# Patient Record
Sex: FEMALE
[De-identification: names, ages, dates, MRNs, and addresses within clinical notes are randomized per-mention and may not be internally consistent; named-entity substitution may affect disease eponyms.]

---

## 2018-06-28 ENCOUNTER — HOSPITAL ENCOUNTER (EMERGENCY)
Dept: HOSPITAL 65 - ER | Age: 18
Discharge: HOME | End: 2018-06-28
Payer: COMMERCIAL

## 2018-06-28 VITALS — HEIGHT: 61 IN | WEIGHT: 125 LBS | BODY MASS INDEX: 23.6 KG/M2

## 2018-06-28 VITALS — SYSTOLIC BLOOD PRESSURE: 114 MMHG | DIASTOLIC BLOOD PRESSURE: 66 MMHG

## 2018-06-28 VITALS — DIASTOLIC BLOOD PRESSURE: 68 MMHG | SYSTOLIC BLOOD PRESSURE: 116 MMHG

## 2018-06-28 DIAGNOSIS — R11.0: ICD-10-CM

## 2018-06-28 DIAGNOSIS — Z79.899: ICD-10-CM

## 2018-06-28 DIAGNOSIS — M54.5: Primary | ICD-10-CM

## 2018-06-28 DIAGNOSIS — Z90.49: ICD-10-CM

## 2018-06-28 LAB
ALP INTEST CFR SERPL: 82 U/L (ref 50–136)
ALT SERPL-CCNC: 22 U/L (ref 12–78)
APPEARANCE UR: CLEAR
AST SERPL-CCNC: 14 U/L (ref 0–35)
BASOPHILS # BLD AUTO: 0 10^3/UL (ref 0–0.1)
BASOPHILS NFR BLD AUTO: 0.6 % (ref 0–0.2)
BILIRUB UR STRIP.AUTO-MCNC: NEGATIVE MG/DL
CALCIUM SERPL-MCNC: 8.8 MG/DL (ref 8.4–10.5)
CO2 BLDA-SCNC: 24 MMOL/L (ref 20–32)
COLOR UR: YELLOW
EOSINOPHIL # BLD AUTO: 0.2 10^3/UL (ref 0–0.2)
EOSINOPHIL NFR BLD AUTO: 2.1 % (ref 0–5)
ERYTHROCYTE [DISTWIDTH] IN BLOOD BY AUTOMATED COUNT: 12.9 % (ref 11.5–14.5)
GLUCOSE PRE 100 G GLC PO SERPL-MCNC: 98 MG/DL (ref 70–110)
HGB BLD-MCNC: 15.2 G/DL (ref 12.4–14.8)
LYMPHOCYTES # BLD AUTO: 1.5 10^3/UL (ref 1.2–5.2)
LYMPHOCYTES NFR BLD AUTO: 20.2 % (ref 24–44)
MCH RBC QN AUTO: 31.5 PG (ref 26–34)
MCHC RBC AUTO-ENTMCNC: 35.6 G/DL (ref 33–37)
MCV RBC AUTO: 88.6 FL (ref 78–100)
MONOCYTES # BLD AUTO: 0.7 10^3/UL (ref 0–0.4)
MONOCYTES NFR BLD AUTO: 10.1 % (ref 5–12)
NEUTROPHILS # BLD AUTO: 4.9 10^3/UL (ref 1.8–8)
NEUTROPHILS NFR BLD AUTO: 66.9 % (ref 41–85)
PLATELET # BLD AUTO: 218 10^3/UL (ref 150–400)
PMV BLD AUTO: 9.8 FL (ref 7.8–11)
UROBILINOGEN UR QL STRIP.AUTO: NORMAL
WBC # BLD AUTO: 7.2 10^3/UL (ref 4.5–12.5)

## 2018-06-28 PROCEDURE — 81025 URINE PREGNANCY TEST: CPT

## 2018-06-28 PROCEDURE — 85025 COMPLETE CBC W/AUTO DIFF WBC: CPT

## 2018-06-28 PROCEDURE — 85610 PROTHROMBIN TIME: CPT

## 2018-06-28 PROCEDURE — 99285 EMERGENCY DEPT VISIT HI MDM: CPT

## 2018-06-28 PROCEDURE — 96372 THER/PROPH/DIAG INJ SC/IM: CPT

## 2018-06-28 PROCEDURE — 85730 THROMBOPLASTIN TIME PARTIAL: CPT

## 2018-06-28 PROCEDURE — 80053 COMPREHEN METABOLIC PANEL: CPT

## 2018-06-28 PROCEDURE — 36415 COLL VENOUS BLD VENIPUNCTURE: CPT

## 2018-06-28 PROCEDURE — 81000 URINALYSIS NONAUTO W/SCOPE: CPT

## 2018-06-28 PROCEDURE — 74176 CT ABD & PELVIS W/O CONTRAST: CPT

## 2018-06-28 NOTE — DIREP
PROCEDURE:CT ABD/PELVIS WITHOUT CONTRAST

 

TECHNIQUE:Axial cuts were obtained from the dome of the diaphragm to the 

ischial tuberosities.  No intravenous contrast was given.  The images were 

viewed at lung and soft tissue settings.  Sagittal and coronal reconstructions 

are provided.   

 

COMPARISON:[facility], CT, CT ABD/PELVIS W/O, 04/21/2018, 08:26 

AM.

 

INDICATIONS:Right flank pain

 

FINDINGS:

LOWER CHEST:The lung bases are clear.

LIVER:Normal.

BILIARY:Normal.

PANCREAS:Normal.

SPLEEN:Normal.

URINARY TRACT:Normal.

ADRENALS:Normal.

AORTA/VASCULAR:Normal.

RETROPERITONEUM:Normal.

BOWEL/MESENTERY:Normal.  The appendix is not clearly identified but there are 

no inflammatory changes in the right lower quadrant.

ABDOMINAL WALL:Normal.

PELVIS:Normal.

BONES:Normal.

OTHER:Normal.

 

CONCLUSION:No abnormalities to explain the patient's symptoms.  No change from 

prior study.

 

 

 

Dictated by: Liu Barros M.D. on 06/28/2018 at 10:23 PM     

Electronically Signed By: Liu Barros M.D. on 06/28/2018 at 10:27 PM

## 2018-06-28 NOTE — ER.PDOC
General


Chief Complaint:  Flank Pain


Stated Complaint:  R SIDE & BACK PAIN


Time seen by MD:  21:48


Source:  patient


Exam Limitations:  no limitations





History of Present Illness


Initial Comments


Right flank pain for 2 days


Severity/Quality:  moderate


Radiation:  RLQ


Associated Symptoms:  other (nausea)


Exacerbated by:  nothing


Relieved By:  nothing


Allergies:  


Coded Allergies:  


     No Known Allergies (Unverified , 5/28/17)


Home Meds


Reported Medications


Hydrocodone Bit/Acetaminophen (NORCO 5-325) 1 Each Tablet, 1 TAB PO Q8HR PRN 

for PAIN


   12/22/16


Vital Signs





First Vital Signs








  Date Time  Temp Pulse Resp B/P (MAP) Pulse Ox O2 Delivery O2 Flow Rate FiO2


 


5/11/18 15:47  98      


 


6/28/18 21:39 99.0  16  98 Room Air  


 


6/28/18 21:39    114/66 (82)    








Last Vital Signs








  Date Time  Temp Pulse Resp B/P (MAP) Pulse Ox O2 Delivery O2 Flow Rate FiO2


 


6/28/18 21:39 99.0 82 16     


 


6/28/18 21:39    114/66 (82) 98 Room Air  











Past Medical History


Medical History:  no pertinent history


Surgical History:  appendectomy


LMP (females 10-50):  3 weeks





Social History


Smoking:  non-smoker


Alcohol Use:  none


Drug Use:  none





Constitutional:  no symptoms reported


EENTM:  no symptoms reported


Respiratory:  no symptoms reported


Cardiovascular:  no symptoms reported


Gastrointestinal:  see HPI


Genitourinary:  see HPI


All Other Systems:  Reviewed and Negative





Physical Exam


General Appearance:  No Apparent Distress, WD/WN


HEENT:  PERRL/EOMI, Normal ENT Inspection, TMs Normal, Pharynx Normal


Neck:  Non-Tender, Full Range of Motion, Supple, Normal Inspection


Respiratory:  chest non-tender, lungs clear, normal breath sounds, no 

respiratory distress, no accessory muscle use


Cardiovascular:  Normal Peripheral Pulses, Regular Rate, Rhythm, No Edema, No 

Gallop, No JVD, No Murmur


Gastrointestinal:  Normal Bowel Sounds, Non Tender, Soft


Back:  CVA Tenderness (R)


Extremities:  Normal Range of Motion, Non-Tender, Normal Inspection, No Pedal 

Edema, No Calf Tenderness, Normal Capillary Refill, Pelvis Stable


Neurologic/Psychiatric:  CNs II-XII NML as Tested, No Motor/Sensory Deficits, 

Alert, Normal Mood/Affect, Oriented x 3


Skin:  Normal Color, Warm/Dry


Lymphatic:  No Adenopathy





EKG/XRAY/CT/US


CT Comments:  Nothing acute on CT abdomen/pelvis





Course


Vitals & review Data





Vital Sign - Last 24 Hours








 6/28/18 6/28/18 6/28/18





 21:39 21:39 21:39


 


Temp 99.0 99.0 99.0


 


Pulse 82 82 82


 


Resp 16 16 16


 


B/P (MAP)  114/66 (82) 


 


Pulse Ox 98 98 


 


O2 Delivery Room Air Room Air 











Departure


Time of Disposition:  22:36


Disposition:  01 HOME, SELF-CARE


Impression:  


 Primary Impression:  


 Low back pain


Condition:  Stable


Referrals:  


FRIDA LEÓN MD (PCP)


PRIMARY CARE PROVIDER





Additional Instructions:  


Ibuprofen


F/U with your PCP tomorrow.


Duration or Time Spent with Pa:  90 mins





Problem Qualifiers








 Primary Impression:  


 Low back pain


 Chronicity:  unspecified  Back pain laterality:  right  Sciatica presence:  

without sciatica  Qualified Codes:  M54.5 - Low back pain








KEYONNA TORRES MD Jun 28, 2018 21:50

## 2018-09-21 ENCOUNTER — HOSPITAL ENCOUNTER (EMERGENCY)
Dept: HOSPITAL 65 - ER | Age: 18
Discharge: HOME | End: 2018-09-21
Payer: COMMERCIAL

## 2018-09-21 VITALS — HEIGHT: 61 IN | WEIGHT: 127 LBS | BODY MASS INDEX: 23.98 KG/M2

## 2018-09-21 VITALS — DIASTOLIC BLOOD PRESSURE: 61 MMHG | SYSTOLIC BLOOD PRESSURE: 111 MMHG

## 2018-09-21 VITALS — SYSTOLIC BLOOD PRESSURE: 111 MMHG | DIASTOLIC BLOOD PRESSURE: 61 MMHG

## 2018-09-21 DIAGNOSIS — B96.89: ICD-10-CM

## 2018-09-21 DIAGNOSIS — N76.0: Primary | ICD-10-CM

## 2018-09-21 LAB
APPEARANCE UR: (no result)
BILIRUB UR STRIP.AUTO-MCNC: NEGATIVE MG/DL
COLOR UR: (no result)
UROBILINOGEN UR QL STRIP.AUTO: 1

## 2018-09-21 PROCEDURE — 87591 N.GONORRHOEAE DNA AMP PROB: CPT

## 2018-09-21 PROCEDURE — 81000 URINALYSIS NONAUTO W/SCOPE: CPT

## 2018-09-21 PROCEDURE — 99284 EMERGENCY DEPT VISIT MOD MDM: CPT

## 2018-09-21 PROCEDURE — 87086 URINE CULTURE/COLONY COUNT: CPT

## 2018-09-21 PROCEDURE — 87210 SMEAR WET MOUNT SALINE/INK: CPT

## 2018-09-21 PROCEDURE — 96372 THER/PROPH/DIAG INJ SC/IM: CPT

## 2018-09-21 PROCEDURE — 87077 CULTURE AEROBIC IDENTIFY: CPT

## 2018-09-21 PROCEDURE — 87186 SC STD MICRODIL/AGAR DIL: CPT

## 2018-09-21 PROCEDURE — 36415 COLL VENOUS BLD VENIPUNCTURE: CPT

## 2018-09-21 PROCEDURE — 81025 URINE PREGNANCY TEST: CPT

## 2018-09-21 NOTE — ER.PDOC
General


Chief Complaint:  Abdomen Pain


Stated Complaint:  ABD PAIN


Time seen by MD:  17:50


Source:  patient


Exam Limitations:  no limitations





History of Present Illness


Initial Comments


Thick foul vag disch for 1 wk.  Had unprotected sex 2 wks ago.  No sores.  

Pelvic pain.  No urine or bowel c/o.


Timing/Duration:  other (above)


Severity/Quality:  mild, moderate


Radiation:  no radiation


Associated Symptoms:  denies symptoms, other (LMP entire last month.  Unusual 

for her, but just came off contraceptive hormone Tx.)


Exacerbated by:  nothing


Relieved By:  nothing


Allergies:  


Coded Allergies:  


     No Known Allergies (Unverified , 5/28/17)


Home Meds


Reported Medications


Hydrocodone Bit/Acetaminophen (NORCO 5-325) 1 Each Tablet, 1 TAB PO Q8HR PRN 

for PAIN


   12/22/16


Vital Signs





First Vital Signs








  Date Time  Temp Pulse Resp B/P (MAP) Pulse Ox O2 Delivery O2 Flow Rate FiO2


 


9/21/18 17:05 98.2 89 16     





 98.2       


 


9/21/18 17:05     99 Room Air  


 


9/21/18 17:08    111/61 (78)    








Last Vital Signs








  Date Time  Temp Pulse Resp B/P (MAP) Pulse Ox O2 Delivery O2 Flow Rate FiO2


 


9/21/18 17:08 98.2 89 16 111/61 (78) 99 Room Air  





 98.2       











Past Medical History


Medical History:  no pertinent history


Surgical History:  no surgical history


LMP (females 10-50):  IRREGULAR MENS.





Social History


Smoking:  non-smoker


Alcohol Use:  none


Drug Use:  none





Constitutional:  no symptoms reported


EENTM:  no symptoms reported


Respiratory:  no symptoms reported


Cardiovascular:  no symptoms reported


Gastrointestinal:  no symptoms reported


Genitourinary:  see HPI


Musculoskeletal:  no symptoms reported


Skin:  no symptoms reported


All Other Systems:  Reviewed and Negative





Physical Exam


General Appearance:  No Apparent Distress, WD/WN, Anxious


HEENT:  PERRL/EOMI, Normal ENT Inspection


Neck:  Non-Tender, Full Range of Motion, Supple


Respiratory:  chest non-tender, lungs clear, normal breath sounds


Cardiovascular:  Normal Peripheral Pulses, Regular Rate, Rhythm, No Edema, No 

Gallop


Gastrointestinal:  Normal Bowel Sounds, No Pulsatile Mass, Tenderness (w/o 

guarding.  )


Pelvic:  Normal External Exam, Discharge (thick white. ), Tender w/ Cervical 

Motion, Tender Adnexa (R>L)


Back:  Normal Inspection, No CVA Tenderness


Extremities:  Normal Range of Motion, Non-Tender, Normal Inspection


Neurologic/Psychiatric:  CNs II-XII NML as Tested, No Motor/Sensory Deficits, 

Alert, Normal Mood/Affect, Oriented x 3


Skin:  Normal Color, Warm/Dry


Lymphatic:  No Adenopathy





Results/Orders


Results/Orders





Laboratory Tests








Test


 9/21/18


17:32


 


Urine Collection Type CCMS 


 


Urine Color YINA (YELLOW) 


 


Urine Appearance CLOUDY (CLEAR) 


 


Urine Bilirubin


 NEGATIVE MG/DL


(NEGATIVE)


 


Urine Ketones


 NEGATIVE


(NEGATIVE)


 


Urine Specific Gravity


 1.020


(1.005-1.035)


 


Urine pH 6.5 (5.0-6.0) 


 


Urine Protein


 NEGATIVE


(NEGATIVE)


 


Urine Urobilinogen 1.0 (NEGATIVE) 


 


Urine Nitrate


 NEGATIVE


(NEGATIVE)


 


Urine Leukocyte Esterase


 100/ul  1+


(NEGATIVE)


 


Urine Blood


 NEGATIVE


(NEGATIVE)


 


Urine RBC


 NONE SEEN


RBC/HPF (NONE


 


Urine WBC


 5-10 WBC/HPF


(0-2)


 


Urine Squamous Epithelial


Cells MODERATE #/HPF


(FEW)


 


Urine Bacteria


 MODERATE (NONE


SEEN)


 


Urine Other


 MODERATE


MUCOUS #/HPF


 


Urine Glucose


 NORMAL


(NEGATIVE)


 


Urine HCG, Qualitative


 NEGATIVE


(NEGATIVE)











Progress


Progress


Preg test neg.  Given STD meds.  wet prep pos for clue cells.





Course


Vitals & review Data





Vital Sign - Last 24 Hours








 9/21/18 9/21/18 9/21/18





 17:05 17:05 17:08


 


Temp 98.2 98.2 98.2





 98.2 98.2 98.2


 


Pulse 89 89 89


 


Resp 16 16 16


 


B/P (MAP)   111/61 (78)


 


Pulse Ox  99 99


 


O2 Delivery  Room Air Room Air








Laboratory Tests








Test


 9/21/18


17:32


 


Urine Collection Type CCMS 


 


Urine Color YINA 


 


Urine Appearance CLOUDY 


 


Urine Bilirubin NEGATIVE MG/DL 


 


Urine Ketones NEGATIVE 


 


Urine Specific Gravity 1.020 


 


Urine pH 6.5 


 


Urine Protein NEGATIVE 


 


Urine Urobilinogen 1.0 


 


Urine Nitrate NEGATIVE 


 


Urine Leukocyte Esterase 100/ul  1+ 


 


Urine Blood NEGATIVE 


 


Urine RBC


 NONE SEEN


RBC/HPF


 


Urine WBC 5-10 WBC/HPF 


 


Urine Squamous Epithelial


Cells MODERATE #/HPF 





 


Urine Bacteria MODERATE 


 


Urine Other


 MODERATE


MUCOUS #/HPF


 


Urine Glucose NORMAL 


 


Urine HCG, Qualitative NEGATIVE 











Departure


Time of Disposition:  20:38


Disposition:  01 HOME, SELF-CARE


Impression:  


 Primary Impression:  


 Bacterial vaginosis


Condition:  Stable


Patient Instructions:  Bacterial Vaginosis, Sexually Transmitted Diseases (STD) 

In Pregnancy


Referrals:  


FRIDA LEÓN MD (PCP)


PRIMARY CARE PROVIDER





Additional Instructions:  


No douching.  See your doctor next week for follow up.  Return if worse.


No intercourse until 1 week after treatment complete.


Duration or Time Spent with Pa:  45











JULIETA POZO DO Sep 21, 2018 19:39

## 2018-09-21 NOTE — NUR
Arrival



Pt in ED with the complaint of lower abdominal pain. States that it feels like 
when she had a bad UTI once, but she is worried that she may have got an STD 
from a boy that she slept with 3 weeks ago. Stated that her friend told her the 
boy was tested and had chlamydia. Pt. stated that she was on her period for 
almost the whole month of August and after that has discharge that what clear 
and white in color. Pt. states that the discharge has a foul odor. Pt is laying 
on gurny visiting with friend, pt is in stable condition at this time

## 2018-10-17 ENCOUNTER — HOSPITAL ENCOUNTER (EMERGENCY)
Dept: HOSPITAL 65 - ER | Age: 18
Discharge: HOME | End: 2018-10-17
Payer: COMMERCIAL

## 2018-10-17 VITALS — SYSTOLIC BLOOD PRESSURE: 117 MMHG | DIASTOLIC BLOOD PRESSURE: 63 MMHG

## 2018-10-17 VITALS — SYSTOLIC BLOOD PRESSURE: 102 MMHG | DIASTOLIC BLOOD PRESSURE: 59 MMHG

## 2018-10-17 VITALS — HEIGHT: 61 IN | WEIGHT: 126 LBS | BODY MASS INDEX: 23.79 KG/M2

## 2018-10-17 VITALS — DIASTOLIC BLOOD PRESSURE: 59 MMHG | SYSTOLIC BLOOD PRESSURE: 102 MMHG

## 2018-10-17 DIAGNOSIS — Z79.899: ICD-10-CM

## 2018-10-17 DIAGNOSIS — R10.12: ICD-10-CM

## 2018-10-17 DIAGNOSIS — R10.11: Primary | ICD-10-CM

## 2018-10-17 DIAGNOSIS — R11.10: ICD-10-CM

## 2018-10-17 DIAGNOSIS — R19.7: ICD-10-CM

## 2018-10-17 LAB
ALP INTEST CFR SERPL: 83 U/L (ref 50–136)
ALT SERPL-CCNC: 19 U/L (ref 12–78)
APPEARANCE UR: (no result)
AST SERPL-CCNC: 11 U/L (ref 0–35)
BASOPHILS # BLD AUTO: 0 10^3/UL (ref 0–0.1)
BASOPHILS NFR BLD AUTO: 0.6 % (ref 0–0.2)
BILIRUB UR STRIP.AUTO-MCNC: NEGATIVE MG/DL
CALCIUM SERPL-MCNC: 9.2 MG/DL (ref 8.4–10.5)
CO2 BLDA-SCNC: 25.3 MMOL/L (ref 20–32)
COLOR UR: YELLOW
EOSINOPHIL # BLD AUTO: 0.2 10^3/UL (ref 0–0.2)
EOSINOPHIL NFR BLD AUTO: 3.7 % (ref 0–5)
ERYTHROCYTE [DISTWIDTH] IN BLOOD BY AUTOMATED COUNT: 12.2 % (ref 11.5–14.5)
GLUCOSE PRE 100 G GLC PO SERPL-MCNC: 104 MG/DL (ref 70–110)
HGB BLD-MCNC: 16.3 G/DL (ref 12.4–14.8)
LYMPHOCYTES # BLD AUTO: 1.9 10^3/UL (ref 1.2–5.2)
LYMPHOCYTES NFR BLD AUTO: 36.4 % (ref 24–44)
Lab: NEGATIVE
MCH RBC QN AUTO: 31.2 PG (ref 26–34)
MCHC RBC AUTO-ENTMCNC: 35.4 G/DL (ref 33–37)
MCV RBC AUTO: 88.1 FL (ref 78–100)
MONOCYTES # BLD AUTO: 0.6 10^3/UL (ref 0–0.4)
MONOCYTES NFR BLD AUTO: 11.2 % (ref 5–12)
NEUTROPHILS # BLD AUTO: 2.4 10^3/UL (ref 1.8–8)
NEUTROPHILS NFR BLD AUTO: 48.1 % (ref 41–85)
PLATELET # BLD AUTO: 214 10^3/UL (ref 150–400)
PMV BLD AUTO: 9.8 FL (ref 7.8–11)
UROBILINOGEN UR QL STRIP.AUTO: NORMAL
WBC # BLD AUTO: 5.1 10^3/UL (ref 4.5–12.5)

## 2018-10-17 PROCEDURE — 85025 COMPLETE CBC W/AUTO DIFF WBC: CPT

## 2018-10-17 PROCEDURE — 82150 ASSAY OF AMYLASE: CPT

## 2018-10-17 PROCEDURE — 87086 URINE CULTURE/COLONY COUNT: CPT

## 2018-10-17 PROCEDURE — 76705 ECHO EXAM OF ABDOMEN: CPT

## 2018-10-17 PROCEDURE — 80307 DRUG TEST PRSMV CHEM ANLYZR: CPT

## 2018-10-17 PROCEDURE — 84703 CHORIONIC GONADOTROPIN ASSAY: CPT

## 2018-10-17 PROCEDURE — 96372 THER/PROPH/DIAG INJ SC/IM: CPT

## 2018-10-17 PROCEDURE — 85610 PROTHROMBIN TIME: CPT

## 2018-10-17 PROCEDURE — 80053 COMPREHEN METABOLIC PANEL: CPT

## 2018-10-17 PROCEDURE — 86677 HELICOBACTER PYLORI ANTIBODY: CPT

## 2018-10-17 PROCEDURE — 99285 EMERGENCY DEPT VISIT HI MDM: CPT

## 2018-10-17 PROCEDURE — 36415 COLL VENOUS BLD VENIPUNCTURE: CPT

## 2018-10-17 PROCEDURE — 83690 ASSAY OF LIPASE: CPT

## 2018-10-17 PROCEDURE — 81000 URINALYSIS NONAUTO W/SCOPE: CPT

## 2018-10-17 NOTE — ER.PDOC
General


Chief Complaint:  Abdomen Pain


Stated Complaint:  ABD PAINS, D


Time seen by MD:  09:00


Source:  patient


Exam Limitations:  no limitations





History of Present Illness


Severity/Quality:  mild


Abdominal Pain Onset Location:  RUQ, LUQ


Associated Symptoms (vomiting):  mild vomiting


Associated Symptoms (diarrhea):  mild, blood-streaked


Allergies:  


Coded Allergies:  


     No Known Allergies (Unverified , 5/28/17)


Home Meds


Reported Medications


Hydrocodone Bit/Acetaminophen (NORCO 5-325) 1 Each Tablet, 1 TAB PO Q8HR PRN 

for PAIN


   12/22/16


Vital Signs





First Vital Signs








  Date Time  Temp Pulse Resp B/P (MAP) Pulse Ox O2 Delivery O2 Flow Rate FiO2


 


9/21/18 20:52  89      


 


10/17/18 09:07 97.8  18  98 Room Air  





 97.8       


 


10/17/18 09:12    117/63 (81)    








Last Vital Signs








  Date Time  Temp Pulse Resp B/P (MAP) Pulse Ox O2 Delivery O2 Flow Rate FiO2


 


10/17/18 09:12 97.3 76 18 117/63 (81) 98 Room Air  





 97.3       











Past Medical History


Medical History:  no pertinent history


Surgical History:  no surgical history


LMP (females 10-50):  1 month





Family History


Significant Family History:  no pertinent family hx





Social History


Smoking:  non-smoker


Alcohol Use:  none


Drug Use:  none





Reviewed


Nursing Reviewed:  Vital Signs, Abn. Noted





All Other Systems:  Reviewed and Negative





Physical Exam


General Appearance:  No Apparent Distress, WD/WN


HEENT:  PERRL/EOMI, Normal ENT Inspection, TMs Normal, Pharynx Normal


Neck:  Non-Tender, Full Range of Motion, Supple, Normal Inspection


Respiratory:  chest non-tender, lungs clear, normal breath sounds, no 

respiratory distress, no accessory muscle use


Cardiovascular:  Normal Peripheral Pulses, Regular Rate, Rhythm, No Edema, No 

Gallop, No JVD, No Murmur


Gastrointestinal:  Tenderness (RUQ)


Back:  Normal Inspection, No CVA Tenderness, No Vertebral Tenderness


Extremities:  Normal Range of Motion, Non-Tender, Normal Inspection, No Pedal 

Edema, No Calf Tenderness, Normal Capillary Refill, Pelvis Stable


Neurologic/Psychiatric:  CNs II-XII NML as Tested, No Motor/Sensory Deficits, 

Alert, Normal Mood/Affect, Oriented x 3


Skin:  Normal Color, Warm/Dry


Lymphatic:  No Adenopathy





Results/Orders


Results/Orders





Laboratory Tests








Test


 10/17/18


09:25


 


White Blood Count


 5.1 10^3/uL


(4.5-12.5)


 


Red Blood Count


 5.22 10^6/uL


(4.00-5.20)


 


Hemoglobin


 16.3 g/dL


(12.4-14.8)


 


Hematocrit


 46.0 %


(36.0-46.0)


 


Mean Corpuscular Volume


 88.1 fL


()


 


Mean Corpuscular Hemoglobin


 31.2 pg


(26-34)


 


Mean Corpuscular Hemoglobin


Concent 35.4 g/dL


(33-37)


 


Red Cell Distribution Width


 12.2 %


(11.5-14.5)


 


Platelet Count


 214 10^3/uL


(150-400)


 


Mean Platelet Volume


 9.8 fL


(7.8-11.0)


 


Neutrophils (%) (Auto)


 48.1 %


(41.0-85.0)


 


Lymphocytes (%) (Auto)


 36.4 %


(24.0-44.0)


 


Monocytes (%) (Auto)


 11.2 %


(5.0-12.0)


 


Neutrophils # (Auto)


 2.4 10^3/uL


(1.8-8.0)


 


Lymphocytes # (Auto)


 1.9 10^3/uL


(1.2-5.2)


 


Monocytes # (Auto)


 0.6 10^3/uL


(0.0-0.4)


 


Absolute Immature Granulocyte


(auto 0 10^3 u/L


(0-2)


 


Eosinophils %


 3.7 %


(0.0-5.0)


 


Basophils %


 0.6 %


(0.0-0.2)


 


Basophils #


 0.0 10^3/uL


(0.0-0.1)


 


Eosinophil Count


 0.2 10^3/uL


(0.0-0.2)


 


Prothrombin Time


 9.9 SEC


(9.8-11.9)


 


Prothrombin Time INR


(Non-Therap) 1.0 





 


Sodium Level


 141 mmol/L


(132-145)


 


Potassium Level


 3.6 mmol/L


(3.6-5.2)


 


Chloride Level


 105.0 mmol/L


()


 


Carbon Dioxide Level


 25.3 mmol/L


(20.0-32)


 


Anion Gap 14.3 


 


Blood Urea Nitrogen


 14 mg/dL


(7-18)


 


Creatinine


 0.82 mg/dL


(0.59-1.40)


 


Estimated GFR (


American) 108.7 (>/=60) 





 


BUN/Creatinine Ratio 17.0 


 


Glucose Level


 104 mg/dL


()


 


Calcium Level


 9.2 mg/dL


(8.4-10.5)


 


Total Bilirubin


 0.6 mg/dL


(0.2-1.0)


 


Aspartate Amino Transf


(AST/SGOT) 11 U/L (0-35) 





 


Alanine Aminotransferase


(ALT/SGPT) 19 U/L (12-78) 





 


Alkaline Phosphatase


 83 U/L


()


 


Total Protein


 7.6 g/dL


(6.4-8.2)


 


Albumin


 4.0 g/dL


(3.4-5.0)


 


Globulin 3.6 


 


Amylase Level


 41 U/L


()


 


Lipase


 167 U/L


(114-286)


 


Serum HCG, Qualitative


 NEGATIVE


(NEGATIVE)


 


Percent Immature Gran (Cell


Imm) 0.00 %


(0.00-0.50)


 


Helicobacter pylori Screen


 NEGATIVE


(NEGATIVE)








Administered Medications








 Medications


  (Trade)  Dose


 Ordered  Sig/Swapnil


 Route


 PRN Reason  Start Time


 Stop Time Status Last Admin


Dose Admin


 


 Diphenoxylate HCl/


 Atropine


  (Lomotil)  1 each  STAT  STAT


 PO


   10/17/18 09:16


 10/17/18 09:21 DC 10/17/18 09:54





 


 Ondansetron HCl


  (Zofran Odt)  4 mg  STAT  STAT


 SL


   10/17/18 09:16


 10/17/18 09:21 DC 10/17/18 09:54





 


 Ketorolac


 Tromethamine


  (Toradol)  60 mg  OT  ONCE


 IM


   10/17/18 09:30


 10/17/18 09:31 DC 10/17/18 09:55














Consult/PCP


Time Consult/PCP Called:  11:11


Consult/PCP:  DR LEÓN





Departure


Time of Disposition:  10:33


Disposition:  01 HOME, SELF-CARE


Impression:  


 Primary Impression:  


 RUQ pain


Condition:  Improved


Referrals:  


FRIDA LEÓN MD (PCP)


PRIMARY CARE PROVIDER


Duration or Time Spent with Pa:  2 HRS











JUANIS DAVIS MD Oct 17, 2018 09:22

## 2018-10-17 NOTE — DIREP
PROCEDURE:US ABDOMEN LIMITED (SINGLE ORGAN - QUAD)

 

COMPARISON:None.

 

INDICATIONS:RUQ PAIN

 

TECHNIQUE:High resolution sonographic examination was performed of the 

abdomen.

 

FINDINGS:

 

RIGHT KIDNEY:10.46 cm x 4.99 cm x 5.81 cm, 158.70 ml

GALL BLADDER WALL:3 mm

CBD:2.6 mm

 

 

PANCREAS:Normal.

LIVER:Normal size and echogenicity. No significant masses.

BILIARY:Normal appearing gallbladder and biliary tree.

RIGHT KIDNEY:Negative.

OTHER:Negative.

 

CONCLUSION:Normal right upper quadrant sonogram with no cholelithiasis or 

dilated biliary ducts.

 

 

 

Dictated by: Ed Mancia M.D. on 10/17/2018 at 09:02 AM     

Electronically Signed By: Ed Mancia M.D. on 10/17/2018 at 09:11 AM

## 2019-01-28 ENCOUNTER — HOSPITAL ENCOUNTER (EMERGENCY)
Dept: HOSPITAL 65 - ER | Age: 19
Discharge: HOME | End: 2019-01-28
Payer: COMMERCIAL

## 2019-01-28 VITALS — HEIGHT: 61 IN | WEIGHT: 130 LBS | BODY MASS INDEX: 24.55 KG/M2

## 2019-01-28 VITALS — SYSTOLIC BLOOD PRESSURE: 110 MMHG | DIASTOLIC BLOOD PRESSURE: 65 MMHG

## 2019-01-28 VITALS — DIASTOLIC BLOOD PRESSURE: 69 MMHG | SYSTOLIC BLOOD PRESSURE: 107 MMHG

## 2019-01-28 DIAGNOSIS — R31.9: ICD-10-CM

## 2019-01-28 DIAGNOSIS — Z3A.01: ICD-10-CM

## 2019-01-28 DIAGNOSIS — Z90.49: ICD-10-CM

## 2019-01-28 DIAGNOSIS — O23.41: Primary | ICD-10-CM

## 2019-01-28 DIAGNOSIS — O20.9: ICD-10-CM

## 2019-01-28 LAB
ALP INTEST CFR SERPL: 57 U/L (ref 50–136)
ALT SERPL-CCNC: 17 U/L (ref 12–78)
APPEARANCE UR: (no result)
AST SERPL-CCNC: 13 U/L (ref 0–35)
BASOPHILS # BLD AUTO: 0 10^3/UL (ref 0–0.1)
BASOPHILS NFR BLD AUTO: 0.4 % (ref 0–0.2)
BILIRUB UR STRIP.AUTO-MCNC: NEGATIVE MG/DL
CALCIUM SERPL-MCNC: 8.8 MG/DL (ref 8.4–10.5)
CO2 BLDA-SCNC: 22.6 MMOL/L (ref 20–32)
COLOR UR: YELLOW
EOSINOPHIL # BLD AUTO: 0 10^3/UL (ref 0–0.2)
EOSINOPHIL NFR BLD AUTO: 0.8 % (ref 0–5)
ERYTHROCYTE [DISTWIDTH] IN BLOOD BY AUTOMATED COUNT: 12.5 % (ref 11.5–14.5)
GLUCOSE PRE 100 G GLC PO SERPL-MCNC: 91 MG/DL (ref 70–110)
HGB BLD-MCNC: 16.1 G/DL (ref 12.4–14.8)
LYMPHOCYTES # BLD AUTO: 1.1 10^3/UL (ref 1.2–5.2)
LYMPHOCYTES NFR BLD AUTO: 22.1 % (ref 24–44)
MCH RBC QN AUTO: 31.8 PG (ref 26–34)
MCHC RBC AUTO-ENTMCNC: 36.3 G/DL (ref 33–37)
MCV RBC AUTO: 87.5 FL (ref 78–100)
MONOCYTES # BLD AUTO: 0.5 10^3/UL (ref 0–0.4)
MONOCYTES NFR BLD AUTO: 8.7 % (ref 5–12)
NEUTROPHILS # BLD AUTO: 3.5 10^3/UL (ref 1.8–8)
NEUTROPHILS NFR BLD AUTO: 67.8 % (ref 41–85)
PLATELET # BLD AUTO: 212 10^3/UL (ref 150–400)
PMV BLD AUTO: 9.9 FL (ref 7.8–11)
UROBILINOGEN UR QL STRIP.AUTO: 4
WBC # BLD AUTO: 5.2 10^3/UL (ref 4.5–12.5)

## 2019-01-28 PROCEDURE — 76801 OB US < 14 WKS SINGLE FETUS: CPT

## 2019-01-28 PROCEDURE — 76817 TRANSVAGINAL US OBSTETRIC: CPT

## 2019-01-28 PROCEDURE — 86900 BLOOD TYPING SEROLOGIC ABO: CPT

## 2019-01-28 PROCEDURE — 85025 COMPLETE CBC W/AUTO DIFF WBC: CPT

## 2019-01-28 PROCEDURE — 87086 URINE CULTURE/COLONY COUNT: CPT

## 2019-01-28 PROCEDURE — 85730 THROMBOPLASTIN TIME PARTIAL: CPT

## 2019-01-28 PROCEDURE — 85610 PROTHROMBIN TIME: CPT

## 2019-01-28 PROCEDURE — 36415 COLL VENOUS BLD VENIPUNCTURE: CPT

## 2019-01-28 PROCEDURE — 84702 CHORIONIC GONADOTROPIN TEST: CPT

## 2019-01-28 PROCEDURE — 99284 EMERGENCY DEPT VISIT MOD MDM: CPT

## 2019-01-28 PROCEDURE — 81025 URINE PREGNANCY TEST: CPT

## 2019-01-28 PROCEDURE — 81000 URINALYSIS NONAUTO W/SCOPE: CPT

## 2019-01-28 PROCEDURE — 80053 COMPREHEN METABOLIC PANEL: CPT

## 2019-01-28 NOTE — ER.PDOC
General


Chief Complaint:  Pregnant less 20 wks


Stated Complaint:  UNDER 20 WKS PREG ABD PAIN


Time seen by MD:  18:32


Source:  patient


Exam Limitations:  no limitations





History of Present Illness


Initial Comments


Vaginal bleeding for 1 week


Timing/Duration:  week, intermittent


Severity/Quality:  cramping


Location of Pain:  pelvic pain


Vaginal Bleed:  spotting


LMP (females 10-50):  pregnant


:  1


Para:  0


Pregnancy Test:  home


Sexual [place] History:  multiple partners


Associated Symptoms:  nausea/vomiting


Allergies:  


Coded Allergies:  


     No Known Allergies (Unverified , 17)


Home Meds


Reported Medications


Hydrocodone Bit/Acetaminophen (NORCO 5-325) 1 Each Tablet, 1 TAB PO Q8HR PRN 

for PAIN


   16





Past Medical History


Medical History:  no pertinent history


Surgical History:  appendectomy


LMP (females 10-50):  pregnant





Social History


Smoking:  non-smoker


Alcohol Use:  none


Drug Use:  none





Review of Systems


Constitutional:  no symptoms reported


EENTM:  no symptoms reported


Respiratory:  no symptoms reported


Cardiovascular:  no symptoms reported


Gastrointestinal:  no symptoms reported


Genitourinary:  see HPI


All Other Systems:  Reviewed and Negative





Physical Exam


General Appearance:  No Apparent Distress, WD/WN


Neck:  nml inspection, non-tender


Cardiovascular/Respiratory:  Regular Rate, Rhythm, No M/R/G, Normal Peripheral 

Pulses, No JVD, Normal Breath Sounds, No Respiratory Distress


Abdomen:  Normal Bowel Sounds, Soft, No Organomegaly, No Pulsatile Mass, 

Tenderness (lower abdomen)


Back:  nml inspection


Extremities:  Normal Range of Motion, Non-Tender, Normal Inspection, No Pedal 

Edema, No Calf Tenderness, Normal Capillary Refill


Neurologic/Psychiatric:  CNs II-XII NML as Tested, No Motor/Sensory Deficits, 

Alert, Normal Mood/Affect, Oriented x 3


Skin:  Normal Color, Warm/Dry





Results/Orders


Results/Orders





Laboratory Tests








Test


 19


00:00 19


16:05 19


16:40


 


Urine Collection Type UNKNOWN   


 


Urine Color


 YELLOW


(YELLOW) 


 





 


Urine Appearance


 SLIGHTLY HAZY


(CLEAR) 


 





 


Urine Bilirubin


 NEGATIVE MG/DL


(NEGATIVE) 


 





 


Urine Ketones


 NEGATIVE


(NEGATIVE) 


 





 


Urine Specific Gravity


 1.015


(1.005-1.035) 


 





 


Urine pH 6.5 (5.0-6.0)   


 


Urine Protein


 15 mg/dL


(NEGATIVE) 


 





 


Urine Urobilinogen 4.0 (NEGATIVE)   


 


Urine Nitrate


 NEGATIVE


(NEGATIVE) 


 





 


Urine Leukocyte Esterase


 NEGATIVE


(NEGATIVE) 


 





 


Urine Blood


 NEGATIVE


(NEGATIVE) 


 





 


Urine RBC


 NONE SEEN


RBC/HPF (NONE 


 





 


Urine WBC


 0-2 WBC/HPF


(0-2) 


 





 


Urine Squamous Epithelial


Cells MODERATE #/HPF


(FEW) 


 





 


Urine Bacteria


 FEW (NONE


SEEN) 


 





 


Urine Other  #/HPF   


 


Urine Glucose


 NORMAL


(NEGATIVE) 


 





 


Urine HCG, Qualitative


 


 POSITIVE


(NEGATIVE) 





 


White Blood Count


 


 


 5.2 10^3/uL


(4.5-12.5)


 


Red Blood Count


 


 


 5.06 10^6/uL


(4.00-5.20)


 


Hemoglobin


 


 


 16.1 g/dL


(12.4-14.8)


 


Hematocrit


 


 


 44.3 %


(36.0-46.0)


 


Mean Corpuscular Volume


 


 


 87.5 fL


()


 


Mean Corpuscular Hemoglobin


 


 


 31.8 pg


(26-34)


 


Mean Corpuscular Hemoglobin


Concent 


 


 36.3 g/dL


(33-37)


 


Red Cell Distribution Width


 


 


 12.5 %


(11.5-14.5)


 


Platelet Count


 


 


 212 10^3/uL


(150-400)


 


Mean Platelet Volume


 


 


 9.9 fL


(7.8-11.0)


 


Neutrophils (%) (Auto)


 


 


 67.8 %


(41.0-85.0)


 


Lymphocytes (%) (Auto)


 


 


 22.1 %


(24.0-44.0)


 


Monocytes (%) (Auto)


 


 


 8.7 %


(5.0-12.0)


 


Neutrophils # (Auto)


 


 


 3.5 10^3/uL


(1.8-8.0)


 


Lymphocytes # (Auto)


 


 


 1.1 10^3/uL


(1.2-5.2)


 


Monocytes # (Auto)


 


 


 0.5 10^3/uL


(0.0-0.4)


 


Absolute Immature Granulocyte


(auto 


 


 0.01 10^3 u/L


(0-2)


 


Eosinophils %


 


 


 0.8 %


(0.0-5.0)


 


Basophils %


 


 


 0.4 %


(0.0-0.2)


 


Basophils #


 


 


 0.0 10^3/uL


(0.0-0.1)


 


Eosinophil Count


 


 


 0.0 10^3/uL


(0.0-0.2)


 


Prothrombin Time


 


 


 10.1 SEC


(9.8-11.9)


 


Prothrombin Time INR


(Non-Therap) 


 


 1.0 





 


Activated Partial


Thromboplast Time 


 


 23.6 SEC


(24.67-30.72)


 


Sodium Level


 


 


 136 mmol/L


(132-145)


 


Potassium Level


 


 


 3.7 mmol/L


(3.6-5.2)


 


Chloride Level


 


 


 102.0 mmol/L


()


 


Carbon Dioxide Level


 


 


 22.6 mmol/L


(20.0-32)


 


Anion Gap   15.1 


 


Blood Urea Nitrogen


 


 


 10 mg/dL


(7-18)


 


Creatinine


 


 


 0.70 mg/dL


(0.59-1.40)


 


Estimated GFR (


American) 


 


 129.1 (>/=60) 





 


BUN/Creatinine Ratio   14.0 


 


Glucose Level


 


 


 91 mg/dL


()


 


Calcium Level


 


 


 8.8 mg/dL


(8.4-10.5)


 


Total Bilirubin


 


 


 0.6 mg/dL


(0.2-1.0)


 


Aspartate Amino Transf


(AST/SGOT) 


 


 13 U/L (0-35) 





 


Alanine Aminotransferase


(ALT/SGPT) 


 


 17 U/L (12-78) 





 


Alkaline Phosphatase


 


 


 57 U/L


()


 


Total Protein


 


 


 7.7 g/dL


(6.4-8.2)


 


Albumin


 


 


 4.0 g/dL


(3.4-5.0)


 


Globulin   3.7 


 


Human Chorionic Gonadotropin,


Quant 


 


 79727 mIU/mL 





 


Percent Immature Gran (Cell


Imm) 


 


 0.20 %


(0.00-0.50)











Progress


Progress


OB Sonogram: Single intrauterine pregnancy corresponding to an estimated 

gestational age 


of approximately 7 weeks 0 days per crown-rump length.  Fetal cardiac activity 


detected at 145 beats per min.





Departure


Time of Disposition:  18:34


Disposition:  01 HOME, SELF-CARE


Impression:  


 Primary Impression:  


 Vaginal bleeding before 22 weeks gestation


 Additional Impression:  


 UTI (urinary tract infection)


Condition:  Stable


Patient Instructions:  Urinary Tract Infection, Easy-to-Read


Referrals:  


FRIDA LEÓN MD (PCP)


PRIMARY CARE PROVIDER








FERMÍN HOFF NP





Additional Instructions:  


Macrobid


Phenergan


[facility], call for appointment tomorrow.


Duration or Time Spent with Pa:  2 hours





Problem Qualifiers








 Additional Impression:  


 UTI (urinary tract infection)


 Urinary tract infection type:  site unspecified  Hematuria presence:  with 

hematuria  Qualified Codes:  N39.0 - Urinary tract infection, site not specified

; R31.9 - Hematuria, unspecified








KEYONNA TORRES MD 2019 18:38

## 2019-01-28 NOTE — NUR
TRIAGE

PT TRIAGED AT THIS TIME IN TRIAGE ROOM DUE TO NO AVAILABLE ED BEDS. PT STATES 
HAS HAD VAGINAL SPOTTING X1 WEEK AND BEGAN HAVING CRAMPING TODAY. PT FOUND OUT 
TODAY THAT SHE IS PREGNANT. PT [facility] TO [facility] AND WAS TOLD 
SHE'S APPROXIMATELY 6 WEEKS PREGNANT. NO ACUTE DISTRESS NOTED. PT WAITING IN 
WAITING ROOM FOR AVAILABLE BED.

## 2019-01-28 NOTE — DIREP
PROCEDURE:US OB 1ST TRI - TV

 

COMPARISON:None.

 

INDICATIONS:VAGINAL BLEEDING

 

TECHNIQUE:Transabdominal and endovaginal pelvic ultrasound images were 

obtained.  Endovaginal images were obtained to optimally evaluate the pregnancy 

and maternal adnexal structures.  

 

FINDINGS:

GESTATIONAL SAC:Present.  No subchorionic hemorrhage.

FLUID:Volume within normal limits.

FETAL POLE:Present.  

CRL =  0.9 cm, corresponding to an EGA of  7 weeks 0 days.  

YOLK SAC:Present.

CARDIAC ACTIVITY:Present.  145 bpm.

 

 

UTERUS:The uterus measures approximately 9.0 x 4.5 x 5.6 cm.  A single 

intrauterine gestational sac is identified.

OVARIES:The right ovary measures approximately 3.4 x 2.1 x 3.9 cm.  The left 

ovary measures approximately 3.3 x 2.0 x 2.1 cm.  Both ovaries appear within 

normal limits.

CUL-DE-SAC:No significant free fluid within the pelvic cul-de-sac.

US TRISTAN:09/16/2019

 

CONCLUSION:

1.  Single intrauterine pregnancy corresponding to an estimated gestational age 

of approximately 7 weeks 0 days per crown-rump length.  Fetal cardiac activity 

detected at 145 beats per min.

 

 

 

Dictated by: Pieter Max M.D.  On 01/28/2019 at 05:22 PM     

Electronically Signed By: Pieter Max M.D. on 01/28/2019 at 05:25 PM

## 2019-02-03 ENCOUNTER — HOSPITAL ENCOUNTER (EMERGENCY)
Dept: HOSPITAL 65 - ER | Age: 19
LOS: 1 days | Discharge: HOME | End: 2019-02-04
Payer: COMMERCIAL

## 2019-02-03 VITALS — HEIGHT: 62 IN | BODY MASS INDEX: 21.16 KG/M2 | WEIGHT: 115 LBS

## 2019-02-03 VITALS — DIASTOLIC BLOOD PRESSURE: 43 MMHG | SYSTOLIC BLOOD PRESSURE: 112 MMHG

## 2019-02-03 DIAGNOSIS — Z90.49: ICD-10-CM

## 2019-02-03 DIAGNOSIS — Z79.899: ICD-10-CM

## 2019-02-03 DIAGNOSIS — O23.41: Primary | ICD-10-CM

## 2019-02-03 DIAGNOSIS — Z3A.08: ICD-10-CM

## 2019-02-03 LAB
ALP INTEST CFR SERPL: 60 U/L (ref 50–136)
ALT SERPL-CCNC: 20 U/L (ref 12–78)
APPEARANCE UR: CLEAR
AST SERPL-CCNC: 13 U/L (ref 0–35)
BASOPHILS # BLD AUTO: 0 10^3/UL (ref 0–0.1)
BASOPHILS NFR BLD AUTO: 0.3 % (ref 0–0.2)
BILIRUB UR STRIP.AUTO-MCNC: NEGATIVE MG/DL
CALCIUM SERPL-MCNC: 8.4 MG/DL (ref 8.4–10.5)
CO2 BLDA-SCNC: 21.6 MMOL/L (ref 20–32)
COLOR UR: YELLOW
EOSINOPHIL # BLD AUTO: 0.1 10^3/UL (ref 0–0.2)
EOSINOPHIL NFR BLD AUTO: 2 % (ref 0–5)
ERYTHROCYTE [DISTWIDTH] IN BLOOD BY AUTOMATED COUNT: 12.3 % (ref 11.5–14.5)
GLUCOSE PRE 100 G GLC PO SERPL-MCNC: 95 MG/DL (ref 70–110)
HGB BLD-MCNC: 15 G/DL (ref 12.4–14.8)
LYMPHOCYTES # BLD AUTO: 1.4 10^3/UL (ref 1.2–5.2)
LYMPHOCYTES NFR BLD AUTO: 20 % (ref 24–44)
MCH RBC QN AUTO: 32.5 PG (ref 26–34)
MCHC RBC AUTO-ENTMCNC: 37.3 G/DL (ref 33–37)
MCV RBC AUTO: 87 FL (ref 78–100)
MONOCYTES # BLD AUTO: 0.7 10^3/UL (ref 0–0.4)
MONOCYTES NFR BLD AUTO: 9.7 % (ref 5–12)
NEUTROPHILS # BLD AUTO: 4.8 10^3/UL (ref 1.8–8)
NEUTROPHILS NFR BLD AUTO: 67.7 % (ref 41–85)
PLATELET # BLD AUTO: 210 10^3/UL (ref 150–400)
PMV BLD AUTO: 10.1 FL (ref 7.8–11)
UROBILINOGEN UR QL STRIP.AUTO: NORMAL
WBC # BLD AUTO: 7 10^3/UL (ref 4.5–12.5)

## 2019-02-03 PROCEDURE — 87086 URINE CULTURE/COLONY COUNT: CPT

## 2019-02-03 PROCEDURE — 80053 COMPREHEN METABOLIC PANEL: CPT

## 2019-02-03 PROCEDURE — 36415 COLL VENOUS BLD VENIPUNCTURE: CPT

## 2019-02-03 PROCEDURE — 81000 URINALYSIS NONAUTO W/SCOPE: CPT

## 2019-02-03 PROCEDURE — 85610 PROTHROMBIN TIME: CPT

## 2019-02-03 PROCEDURE — 85730 THROMBOPLASTIN TIME PARTIAL: CPT

## 2019-02-03 PROCEDURE — 99283 EMERGENCY DEPT VISIT LOW MDM: CPT

## 2019-02-03 PROCEDURE — 85025 COMPLETE CBC W/AUTO DIFF WBC: CPT

## 2019-02-03 PROCEDURE — 84702 CHORIONIC GONADOTROPIN TEST: CPT

## 2019-02-03 NOTE — ER.PDOC
General


Chief Complaint:  Pregnant less 20 wks


Stated Complaint:  ABD PAIN


Time seen by MD:  23:00


Source:  patient


Exam Limitations:  no limitations





History of Present Illness


Initial Comments


Pt with abdominal and pelvic pain radiating to back, she was seen for similar 

reasons on Monday, an US was performed and an early pregnancy and only a UTI


Timing/Duration:  yesterday


Severity/Quality:  severe, cramping


Location of Pain:  low back pain


LMP (females 10-50):  pregnant


Associated Symptoms:  lower back pain


Allergies:  


Coded Allergies:  


     No Known Allergies (Unverified , 5/28/17)


Home Meds


Reported Medications


Hydrocodone Bit/Acetaminophen (NORCO 5-325) 1 Each Tablet, 1 TAB PO Q8HR PRN 

for PAIN


   12/22/16





Past Medical History


Medical History:  no pertinent history


Surgical History:  appendectomy


LMP (females 10-50):  2 months





Social History


Smoking:  non-smoker


Drug Use:  none





Review of Systems


Constitutional:  no symptoms reported


EENTM:  no symptoms reported


Respiratory:  no symptoms reported


Cardiovascular:  no symptoms reported


Gastrointestinal:  see HPI


Genitourinary:  see HPI


Musculoskeletal:  no symptoms reported


Skin:  no symptoms reported


Psychiatric/Neurological:  no symptoms reported


Endocrine:  no symptoms reported


Hematologic/Lymphatic:  no symptoms reported





Physical Exam


General Appearance:  No Apparent Distress, WD/WN


EENT:  eyes nml inspection, nml ENT inspection, pharynx nml


Neck:  nml inspection, non-tender


Cardiovascular/Respiratory:  Regular Rate, Rhythm, No M/R/G, Normal Peripheral 

Pulses, No JVD, Normal Breath Sounds, No Respiratory Distress


Abdomen:  Normal Bowel Sounds, Soft, No Organomegaly, No Pulsatile Mass, 

Tenderness (bilateral pelvic pain)


Back:  CVA tenderness (bilateral)


Extremities:  Normal Range of Motion, Non-Tender, Normal Inspection, No Pedal 

Edema, No Calf Tenderness, Normal Capillary Refill


Neurologic/Psychiatric:  CNs II-XII NML as Tested, No Motor/Sensory Deficits, 

Alert, Normal Mood/Affect, Oriented x 3


Skin:  Normal Color, Warm/Dry


Lymphatic:  No Adenopathy





Results/Orders


Results/Orders





Laboratory Tests








Test


 2/3/19


22:57 2/3/19


23:03


 


Urine Collection Type CCMS  


 


Urine Color


 YELLOW


(YELLOW) 





 


Urine Appearance CLEAR (CLEAR)  


 


Urine Bilirubin


 NEGATIVE MG/DL


(NEGATIVE) 





 


Urine Ketones


 NEGATIVE


(NEGATIVE) 





 


Urine Specific Gravity


 1.010


(1.005-1.035) 





 


Urine pH 7 (5.0-6.0)  


 


Urine Protein


 NEGATIVE


(NEGATIVE) 





 


Urine Urobilinogen


 NORMAL


(NEGATIVE) 





 


Urine Nitrate


 NEGATIVE


(NEGATIVE) 





 


Urine Leukocyte Esterase


 NEGATIVE


(NEGATIVE) 





 


Urine Blood


 25 1+


(NEGATIVE) 





 


Urine RBC


 0-2 RBC/HPF


(NONE SEEN) 





 


Urine WBC


 0-2 WBC/HPF


(0-2) 





 


Urine Squamous Epithelial


Cells FEW #/HPF


(FEW) 





 


Urine Bacteria


 RARE (NONE


SEEN) 





 


Urine Other


 MUCUS RARE


#/HPF 





 


Urine Glucose


 NORMAL


(NEGATIVE) 





 


White Blood Count


 


 7.0 10^3/uL


(4.5-12.5)


 


Red Blood Count


 


 4.62 10^6/uL


(4.00-5.20)


 


Hemoglobin


 


 15.0 g/dL


(12.4-14.8)


 


Hematocrit


 


 40.2 %


(36.0-46.0)


 


Mean Corpuscular Volume


 


 87.0 fL


()


 


Mean Corpuscular Hemoglobin


 


 32.5 pg


(26-34)


 


Mean Corpuscular Hemoglobin


Concent 


 37.3 g/dL


(33-37)


 


Red Cell Distribution Width


 


 12.3 %


(11.5-14.5)


 


Platelet Count


 


 210 10^3/uL


(150-400)


 


Mean Platelet Volume


 


 10.1 fL


(7.8-11.0)


 


Neutrophils (%) (Auto)


 


 67.7 %


(41.0-85.0)


 


Lymphocytes (%) (Auto)


 


 20.0 %


(24.0-44.0)


 


Monocytes (%) (Auto)


 


 9.7 %


(5.0-12.0)


 


Neutrophils # (Auto)


 


 4.8 10^3/uL


(1.8-8.0)


 


Lymphocytes # (Auto)


 


 1.4 10^3/uL


(1.2-5.2)


 


Monocytes # (Auto)


 


 0.7 10^3/uL


(0.0-0.4)


 


Absolute Immature Granulocyte


(auto 


 0.02 10^3 u/L


(0-2)


 


Eosinophils %


 


 2.0 %


(0.0-5.0)


 


Basophils %


 


 0.3 %


(0.0-0.2)


 


Basophils #


 


 0.0 10^3/uL


(0.0-0.1)


 


Eosinophil Count


 


 0.1 10^3/uL


(0.0-0.2)


 


Sodium Level


 


 137 mmol/L


(132-145)


 


Potassium Level


 


 3.6 mmol/L


(3.6-5.2)


 


Chloride Level


 


 104.0 mmol/L


()


 


Carbon Dioxide Level


 


 21.6 mmol/L


(20.0-32)


 


Anion Gap  15.0 


 


Blood Urea Nitrogen  7 mg/dL (7-18) 


 


Creatinine


 


 0.72 mg/dL


(0.59-1.40)


 


Estimated GFR (


American) 


 125.0 (>/=60) 





 


BUN/Creatinine Ratio  9.0 


 


Glucose Level


 


 95 mg/dL


()


 


Calcium Level


 


 8.4 mg/dL


(8.4-10.5)


 


Total Bilirubin


 


 0.3 mg/dL


(0.2-1.0)


 


Aspartate Amino Transf


(AST/SGOT) 


 13 U/L (0-35) 





 


Alanine Aminotransferase


(ALT/SGPT) 


 20 U/L (12-78) 





 


Alkaline Phosphatase


 


 60 U/L


()


 


Total Protein


 


 7.1 g/dL


(6.4-8.2)


 


Albumin


 


 3.5 g/dL


(3.4-5.0)


 


Globulin  3.6 


 


Human Chorionic Gonadotropin,


Quant 


 37310 mIU/mL 





 


Percent Immature Gran (Cell


Imm) 


 0.30 %


(0.00-0.50)











Departure


Time of Disposition:  23:54


Disposition:  01 HOME, SELF-CARE


Impression:  


 Primary Impression:  


 Urinary tract infection


Condition:  Stable


Patient Instructions:  Urinary Tract Infection


Referrals:  


FRIDA LEÓN MD (PCP)


PRIMARY CARE PROVIDER


Duration or Time Spent with Pa:  20 BASILICO,LEOPOLDO M MD Feb 3, 2019 23:06

## 2019-02-04 VITALS — DIASTOLIC BLOOD PRESSURE: 56 MMHG | SYSTOLIC BLOOD PRESSURE: 114 MMHG

## 2019-02-04 VITALS — SYSTOLIC BLOOD PRESSURE: 114 MMHG | DIASTOLIC BLOOD PRESSURE: 56 MMHG

## 2019-02-18 ENCOUNTER — HOSPITAL ENCOUNTER (EMERGENCY)
Dept: HOSPITAL 65 - ER | Age: 19
Discharge: HOME | End: 2019-02-18
Payer: COMMERCIAL

## 2019-02-18 VITALS — WEIGHT: 129 LBS | BODY MASS INDEX: 25.32 KG/M2 | HEIGHT: 60 IN

## 2019-02-18 VITALS — DIASTOLIC BLOOD PRESSURE: 67 MMHG | SYSTOLIC BLOOD PRESSURE: 125 MMHG

## 2019-02-18 DIAGNOSIS — Z79.899: ICD-10-CM

## 2019-02-18 DIAGNOSIS — Z90.49: ICD-10-CM

## 2019-02-18 DIAGNOSIS — F32.9: ICD-10-CM

## 2019-02-18 DIAGNOSIS — Z3A.10: ICD-10-CM

## 2019-02-18 DIAGNOSIS — O99.341: Primary | ICD-10-CM

## 2019-02-18 PROCEDURE — 99284 EMERGENCY DEPT VISIT MOD MDM: CPT

## 2019-02-18 NOTE — ER.PDOC
General


Chief Complaint:  General Complaint


Stated Complaint:  GENERAL


Time seen by MD:  09:47


Source:  patient


Exam Limitations:  no limitations





History of Present Illness


Initial Comments


Depressed for 4 days. no SI or HI. Patient is 10 weeks pregnant.


Severity:  moderate


Associated Symptoms:  Depressed


Allergies:  


Coded Allergies:  


     No Known Allergies (Unverified , 5/28/17)


Home Meds


Reported Medications


Hydrocodone Bit/Acetaminophen (NORCO 5-325) 1 Each Tablet, 1 TAB PO Q8HR PRN 

for PAIN


   12/22/16





Past Medical History


Medical History:  no pertinent history


Surgical History:  appendectomy


LMP (females 10-50):  pregnant





Social History


Smoking:  non-smoker


Alcohol Use:  none


Drug Use:  none





Review of Systems


Constitutional:  no symptoms reported


EENTM:  no symptoms reported


Respiratory:  no symptoms reported


Cardiovascular:  no symptoms reported


Gastrointestinal:  no symptoms reported


Psychiatric/Neurological:  see HPI


All Other Systems:  Reviewed and Negative





Physical Exam


General Appearance:  No acute distress, Alert


Neck:  Non-Tender, Full Range of Motion, Supple, Normal Inspection


Respiratory:  chest non-tender, lungs clear, normal breath sounds, no 

respiratory distress, no accessory muscle use


Cardiovascular:  Normal Peripheral Pulses, Regular Rate, Rhythm, No Edema, No 

Gallop, No JVD, No Murmur


Gastrointestinal:  Normal Bowel Sounds, No Organomegaly, No Pulsatile Mass, Non 

Tender, Soft


Extremities:  Non-Tender, Normal Range of Motion, No Evidence of Trauma, No 

Edema


Neurological/Psychiatric:  Alert, Depressed Affect


Appearance/Memory/Insight:  Appropriate Appearance, Appropriate Insight, Neat, 

No Memory Impairment


Behavior/Eye Contact/Speech:  Cooperative, Good Eye Contact, Normal Speech


Thoughts/Hallucinations:  Normal Thought Pattern, No Apparent Hallucination


Skin:  Normal Color, Warm/Dry





Progress


Progress


Spoke to Dr. Andersen and Dr. Palomo and patient will be going to see Dr. Palomo 

in his office from the ED.





Departure


Time of Disposition:  09:50


Disposition:  01 HOME, SELF-CARE


Impression:  


 Primary Impression:  


 Depressed


Condition:  Stable


Referrals:  


FRIDA LEÓN MD (PCP)


PRIMARY CARE PROVIDER





Additional Instructions:  


F/U with Dr. Palomo this morning in his office.


Duration or Time Spent with Pa:  20 mins





Problem Qualifiers








 Primary Impression:  


 Depressed


 Depression Type:  unspecified  Qualified Codes:  F32.9 - Major depressive 

disorder, single episode, unspecified








KEYONNA TORRES MD Feb 18, 2019 09:50

## 2019-02-18 NOTE — NUR
ARRIVAL



PATIENT TO ROOM 8, AMBULATORY.  STATES THAT SHE IS DEPRESSED AND HAS A LOT 
GOING ON.  PATIENT IS 10 WEEKS PREGNANT AND TEARFUL AND WANTS SOMETHING FOR 
DEPRESSION.  SHE CALLED HER OBGYN WHO IS OUT OF TOWN AND WAS TOLD TO COME TO 
THE ER.  ASSSESSMENT COMPLETED, AWAITING MD TELLO.